# Patient Record
Sex: FEMALE | Race: WHITE | URBAN - METROPOLITAN AREA
[De-identification: names, ages, dates, MRNs, and addresses within clinical notes are randomized per-mention and may not be internally consistent; named-entity substitution may affect disease eponyms.]

---

## 2019-04-01 ENCOUNTER — HOSPITAL ENCOUNTER (OUTPATIENT)
Dept: MEDSURG UNIT | Facility: HOSPITAL | Age: 64
End: 2019-04-08
Attending: INTERNAL MEDICINE | Admitting: INTERNAL MEDICINE

## 2019-04-01 LAB
ABS NEUT (OLG): 5.98 X10(3)/MCL (ref 2.1–9.2)
ALBUMIN SERPL-MCNC: 3.5 GM/DL (ref 3.4–5)
ALBUMIN/GLOB SERPL: 0.8 {RATIO}
ALP SERPL-CCNC: 65 UNIT/L (ref 38–126)
ALT SERPL-CCNC: 22 UNIT/L (ref 12–78)
APPEARANCE, UA: ABNORMAL
AST SERPL-CCNC: 21 UNIT/L (ref 15–37)
BACTERIA SPEC CULT: ABNORMAL /HPF
BASOPHILS # BLD AUTO: 0 X10(3)/MCL (ref 0–0.2)
BASOPHILS NFR BLD AUTO: 0 %
BILIRUB SERPL-MCNC: 0.5 MG/DL (ref 0.2–1)
BILIRUB UR QL STRIP: NEGATIVE
BILIRUBIN DIRECT+TOT PNL SERPL-MCNC: 0.1 MG/DL (ref 0–0.2)
BILIRUBIN DIRECT+TOT PNL SERPL-MCNC: 0.4 MG/DL (ref 0–0.8)
BUN SERPL-MCNC: 16 MG/DL (ref 7–18)
CALCIUM SERPL-MCNC: 8.6 MG/DL (ref 8.5–10.1)
CHLORIDE SERPL-SCNC: 107 MMOL/L (ref 98–107)
CK SERPL-CCNC: 158 UNIT/L (ref 26–192)
CO2 SERPL-SCNC: 25 MMOL/L (ref 21–32)
COLOR UR: YELLOW
CREAT SERPL-MCNC: 0.81 MG/DL (ref 0.55–1.02)
CRP SERPL HS-MCNC: 5.14 MG/L (ref 0–3)
EOSINOPHIL # BLD AUTO: 0 X10(3)/MCL (ref 0–0.9)
EOSINOPHIL NFR BLD AUTO: 0 %
ERYTHROCYTE [DISTWIDTH] IN BLOOD BY AUTOMATED COUNT: 12.3 % (ref 11.5–17)
ERYTHROCYTE [SEDIMENTATION RATE] IN BLOOD: 18 MM/HR (ref 0–20)
EST. AVERAGE GLUCOSE BLD GHB EST-MCNC: 131 MG/DL
GLOBULIN SER-MCNC: 4.3 GM/DL (ref 2.4–3.5)
GLUCOSE (UA): NEGATIVE
GLUCOSE SERPL-MCNC: 78 MG/DL (ref 74–106)
HBA1C MFR BLD: 6.2 % (ref 4.2–6.3)
HCT VFR BLD AUTO: 45.8 % (ref 37–47)
HGB BLD-MCNC: 13.8 GM/DL (ref 12–16)
HGB UR QL STRIP: NEGATIVE
KETONES UR QL STRIP: NEGATIVE
LEUKOCYTE ESTERASE UR QL STRIP: ABNORMAL
LYMPHOCYTES # BLD AUTO: 1.2 X10(3)/MCL (ref 0.6–4.6)
LYMPHOCYTES NFR BLD AUTO: 15 %
MCH RBC QN AUTO: 29.3 PG (ref 27–31)
MCHC RBC AUTO-ENTMCNC: 30.1 GM/DL (ref 33–36)
MCV RBC AUTO: 97.2 FL (ref 80–94)
MONOCYTES # BLD AUTO: 0.6 X10(3)/MCL (ref 0.1–1.3)
MONOCYTES NFR BLD AUTO: 8 %
NEUTROPHILS # BLD AUTO: 5.98 X10(3)/MCL (ref 2.1–9.2)
NEUTROPHILS NFR BLD AUTO: 75 %
NITRITE UR QL STRIP: POSITIVE
PH UR STRIP: 6 [PH] (ref 5–9)
PLATELET # BLD AUTO: 265 X10(3)/MCL (ref 130–400)
PMV BLD AUTO: 9.6 FL (ref 9.4–12.4)
POTASSIUM SERPL-SCNC: 3.6 MMOL/L (ref 3.5–5.1)
PROT SERPL-MCNC: 7.8 GM/DL (ref 6.4–8.2)
PROT UR QL STRIP: NEGATIVE
RBC # BLD AUTO: 4.71 X10(6)/MCL (ref 4.2–5.4)
RBC #/AREA URNS HPF: 25 /HPF (ref 0–2)
SODIUM SERPL-SCNC: 140 MMOL/L (ref 136–145)
SP GR UR STRIP: 1.02 (ref 1–1.03)
SQUAMOUS EPITHELIAL, UA: ABNORMAL
T4 FREE SERPL-MCNC: 1.19 NG/DL (ref 0.76–1.46)
TSH SERPL-ACNC: 2.59 MIU/L (ref 0.36–3.74)
UROBILINOGEN UR STRIP-ACNC: 0.2
VIT B12 SERPL-MCNC: 529 PG/ML (ref 193–986)
WBC # SPEC AUTO: 7.9 X10(3)/MCL (ref 4.5–11.5)
WBC #/AREA URNS HPF: 27 /HPF (ref 0–3)

## 2019-04-02 LAB
APTT PPP: 29.1 SECOND(S) (ref 24.2–33.9)
GLUCOSE CSF-MCNC: 55 MG/DL (ref 40–70)
GRAM STN SPEC: NORMAL
INR PPP: 1.1 (ref 0–1.3)
LDH SERPL-CCNC: 136 UNIT/L (ref 84–246)
PROT CSF-MCNC: 55 MG/DL (ref 15–45)
PROT CSF-MCNC: 59 MG/DL (ref 15–45)
PROTHROMBIN TIME: 13.9 SECOND(S) (ref 12–14)

## 2019-04-04 LAB
ABS NEUT (OLG): 1.85 X10(3)/MCL (ref 2.1–9.2)
ALBUMIN SERPL-MCNC: 2.6 GM/DL (ref 3.4–5)
ALBUMIN/GLOB SERPL: 0.4 {RATIO}
ALP SERPL-CCNC: 50 UNIT/L (ref 38–126)
ALT SERPL-CCNC: 41 UNIT/L (ref 12–78)
AST SERPL-CCNC: 39 UNIT/L (ref 15–37)
BASOPHILS # BLD AUTO: 0 X10(3)/MCL (ref 0–0.2)
BASOPHILS NFR BLD AUTO: 0 %
BILIRUB SERPL-MCNC: 0.8 MG/DL (ref 0.2–1)
BILIRUBIN DIRECT+TOT PNL SERPL-MCNC: 0.2 MG/DL (ref 0–0.2)
BILIRUBIN DIRECT+TOT PNL SERPL-MCNC: 0.6 MG/DL (ref 0–0.8)
BUN SERPL-MCNC: 12 MG/DL (ref 7–18)
CALCIUM SERPL-MCNC: 8 MG/DL (ref 8.5–10.1)
CHLORIDE SERPL-SCNC: 108 MMOL/L (ref 98–107)
CO2 SERPL-SCNC: 23 MMOL/L (ref 21–32)
CREAT SERPL-MCNC: 0.74 MG/DL (ref 0.55–1.02)
EOSINOPHIL # BLD AUTO: 0 X10(3)/MCL (ref 0–0.9)
EOSINOPHIL NFR BLD AUTO: 1 %
ERYTHROCYTE [DISTWIDTH] IN BLOOD BY AUTOMATED COUNT: 12.8 % (ref 11.5–17)
GLOBULIN SER-MCNC: 6.7 GM/DL (ref 2.4–3.5)
GLUCOSE SERPL-MCNC: 85 MG/DL (ref 74–106)
HCT VFR BLD AUTO: 36.9 % (ref 37–47)
HGB BLD-MCNC: 11.4 GM/DL (ref 12–16)
LYMPHOCYTES # BLD AUTO: 0.6 X10(3)/MCL (ref 0.6–4.6)
LYMPHOCYTES NFR BLD AUTO: 20 %
MCH RBC QN AUTO: 29.2 PG (ref 27–31)
MCHC RBC AUTO-ENTMCNC: 30.9 GM/DL (ref 33–36)
MCV RBC AUTO: 94.6 FL (ref 80–94)
MONOCYTES # BLD AUTO: 0.4 X10(3)/MCL (ref 0.1–1.3)
MONOCYTES NFR BLD AUTO: 14 %
NEUTROPHILS # BLD AUTO: 1.85 X10(3)/MCL (ref 2.1–9.2)
NEUTROPHILS NFR BLD AUTO: 64 %
PLATELET # BLD AUTO: 147 X10(3)/MCL (ref 130–400)
PMV BLD AUTO: 9.9 FL (ref 9.4–12.4)
POTASSIUM SERPL-SCNC: 3.6 MMOL/L (ref 3.5–5.1)
PROT SERPL-MCNC: 9.3 GM/DL (ref 6.4–8.2)
RBC # BLD AUTO: 3.9 X10(6)/MCL (ref 4.2–5.4)
SODIUM SERPL-SCNC: 138 MMOL/L (ref 136–145)
WBC # SPEC AUTO: 2.9 X10(3)/MCL (ref 4.5–11.5)

## 2019-04-05 LAB
ABS NEUT (OLG): 1.87 X10(3)/MCL (ref 2.1–9.2)
ALBUMIN SERPL-MCNC: 2.5 GM/DL (ref 3.4–5)
ALBUMIN/GLOB SERPL: 0.4 RATIO (ref 1.1–2)
ALP SERPL-CCNC: 49 UNIT/L (ref 38–126)
ALT SERPL-CCNC: 53 UNIT/L (ref 12–78)
AST SERPL-CCNC: 53 UNIT/L (ref 15–37)
BASOPHILS # BLD AUTO: 0 X10(3)/MCL (ref 0–0.2)
BASOPHILS NFR BLD AUTO: 0 %
BILIRUB SERPL-MCNC: 0.6 MG/DL (ref 0.2–1)
BILIRUBIN DIRECT+TOT PNL SERPL-MCNC: 0.1 MG/DL (ref 0–0.5)
BILIRUBIN DIRECT+TOT PNL SERPL-MCNC: 0.5 MG/DL (ref 0–0.8)
BUN SERPL-MCNC: 8 MG/DL (ref 7–18)
CALCIUM SERPL-MCNC: 8.1 MG/DL (ref 8.5–10.1)
CHLORIDE SERPL-SCNC: 109 MMOL/L (ref 98–107)
CO2 SERPL-SCNC: 23 MMOL/L (ref 21–32)
CREAT SERPL-MCNC: 0.68 MG/DL (ref 0.55–1.02)
EOSINOPHIL # BLD AUTO: 0 X10(3)/MCL (ref 0–0.9)
EOSINOPHIL NFR BLD AUTO: 2 %
ERYTHROCYTE [DISTWIDTH] IN BLOOD BY AUTOMATED COUNT: 12.7 % (ref 11.5–17)
GLOBULIN SER-MCNC: 5.7 GM/DL (ref 2.4–3.5)
GLUCOSE SERPL-MCNC: 85 MG/DL (ref 74–106)
HCT VFR BLD AUTO: 35.3 % (ref 37–47)
HGB BLD-MCNC: 10.9 GM/DL (ref 12–16)
LYMPHOCYTES # BLD AUTO: 0.7 X10(3)/MCL (ref 0.6–4.6)
LYMPHOCYTES NFR BLD AUTO: 23 %
MAGNESIUM SERPL-MCNC: 2.4 MG/DL (ref 1.8–2.4)
MCH RBC QN AUTO: 29.1 PG (ref 27–31)
MCHC RBC AUTO-ENTMCNC: 30.9 GM/DL (ref 33–36)
MCV RBC AUTO: 94.1 FL (ref 80–94)
MONOCYTES # BLD AUTO: 0.4 X10(3)/MCL (ref 0.1–1.3)
MONOCYTES NFR BLD AUTO: 14 %
NEUTROPHILS # BLD AUTO: 1.87 X10(3)/MCL (ref 2.1–9.2)
NEUTROPHILS NFR BLD AUTO: 61 %
PLATELET # BLD AUTO: 114 X10(3)/MCL (ref 130–400)
PMV BLD AUTO: 10.8 FL (ref 9.4–12.4)
POTASSIUM SERPL-SCNC: 4 MMOL/L (ref 3.5–5.1)
PROT SERPL-MCNC: 8.2 GM/DL (ref 6.4–8.2)
RBC # BLD AUTO: 3.75 X10(6)/MCL (ref 4.2–5.4)
SODIUM SERPL-SCNC: 140 MMOL/L (ref 136–145)
WBC # SPEC AUTO: 3.1 X10(3)/MCL (ref 4.5–11.5)

## 2019-04-06 LAB
ABS NEUT (OLG): 2.21 X10(3)/MCL (ref 2.1–9.2)
ALBUMIN SERPL-MCNC: 2.5 GM/DL (ref 3.4–5)
ALBUMIN/GLOB SERPL: 0.5 RATIO (ref 1.1–2)
ALP SERPL-CCNC: 49 UNIT/L (ref 38–126)
ALT SERPL-CCNC: 50 UNIT/L (ref 12–78)
AST SERPL-CCNC: 33 UNIT/L (ref 15–37)
BASOPHILS # BLD AUTO: 0 X10(3)/MCL (ref 0–0.2)
BASOPHILS NFR BLD AUTO: 1 %
BILIRUB SERPL-MCNC: 0.4 MG/DL (ref 0.2–1)
BILIRUBIN DIRECT+TOT PNL SERPL-MCNC: 0.1 MG/DL (ref 0–0.5)
BILIRUBIN DIRECT+TOT PNL SERPL-MCNC: 0.3 MG/DL (ref 0–0.8)
BUN SERPL-MCNC: 11 MG/DL (ref 7–18)
CALCIUM SERPL-MCNC: 8.2 MG/DL (ref 8.5–10.1)
CHLORIDE SERPL-SCNC: 110 MMOL/L (ref 98–107)
CO2 SERPL-SCNC: 25 MMOL/L (ref 21–32)
CREAT SERPL-MCNC: 0.65 MG/DL (ref 0.55–1.02)
EOSINOPHIL # BLD AUTO: 0 X10(3)/MCL (ref 0–0.9)
EOSINOPHIL NFR BLD AUTO: 1 %
ERYTHROCYTE [DISTWIDTH] IN BLOOD BY AUTOMATED COUNT: 12.7 % (ref 11.5–17)
GLOBULIN SER-MCNC: 5.5 GM/DL (ref 2.4–3.5)
GLUCOSE SERPL-MCNC: 80 MG/DL (ref 74–106)
HCT VFR BLD AUTO: 34.5 % (ref 37–47)
HGB BLD-MCNC: 10.7 GM/DL (ref 12–16)
LYMPHOCYTES # BLD AUTO: 0.7 X10(3)/MCL (ref 0.6–4.6)
LYMPHOCYTES NFR BLD AUTO: 21 %
MAGNESIUM SERPL-MCNC: 2.4 MG/DL (ref 1.8–2.4)
MCH RBC QN AUTO: 29.5 PG (ref 27–31)
MCHC RBC AUTO-ENTMCNC: 31 GM/DL (ref 33–36)
MCV RBC AUTO: 95 FL (ref 80–94)
MONOCYTES # BLD AUTO: 0.5 X10(3)/MCL (ref 0.1–1.3)
MONOCYTES NFR BLD AUTO: 14 %
NEUTROPHILS # BLD AUTO: 2.21 X10(3)/MCL (ref 2.1–9.2)
NEUTROPHILS NFR BLD AUTO: 63 %
PLATELET # BLD AUTO: 132 X10(3)/MCL (ref 130–400)
PMV BLD AUTO: 9.9 FL (ref 9.4–12.4)
POTASSIUM SERPL-SCNC: 3.6 MMOL/L (ref 3.5–5.1)
PROT SERPL-MCNC: 8 GM/DL (ref 6.4–8.2)
RBC # BLD AUTO: 3.63 X10(6)/MCL (ref 4.2–5.4)
SODIUM SERPL-SCNC: 143 MMOL/L (ref 136–145)
WBC # SPEC AUTO: 3.5 X10(3)/MCL (ref 4.5–11.5)

## 2019-04-07 LAB — FINAL CULTURE: NORMAL

## 2019-04-08 LAB
ABS NEUT (OLG): 6.29 X10(3)/MCL (ref 2.1–9.2)
ALBUMIN SERPL-MCNC: 2.7 GM/DL (ref 3.4–5)
ALBUMIN/GLOB SERPL: 0.5 RATIO (ref 1.1–2)
ALP SERPL-CCNC: 59 UNIT/L (ref 38–126)
ALT SERPL-CCNC: 38 UNIT/L (ref 12–78)
AST SERPL-CCNC: 28 UNIT/L (ref 15–37)
BASOPHILS NFR BLD MANUAL: 1 % (ref 0–2)
BILIRUB SERPL-MCNC: 0.7 MG/DL (ref 0.2–1)
BILIRUBIN DIRECT+TOT PNL SERPL-MCNC: 0.1 MG/DL (ref 0–0.5)
BILIRUBIN DIRECT+TOT PNL SERPL-MCNC: 0.6 MG/DL (ref 0–0.8)
BUN SERPL-MCNC: 13 MG/DL (ref 7–18)
CALCIUM SERPL-MCNC: 8.6 MG/DL (ref 8.5–10.1)
CHLORIDE SERPL-SCNC: 105 MMOL/L (ref 98–107)
CO2 SERPL-SCNC: 26 MMOL/L (ref 21–32)
CREAT SERPL-MCNC: 0.61 MG/DL (ref 0.55–1.02)
ERYTHROCYTE [DISTWIDTH] IN BLOOD BY AUTOMATED COUNT: 12.6 % (ref 11.5–17)
GLOBULIN SER-MCNC: 5.9 GM/DL (ref 2.4–3.5)
GLUCOSE SERPL-MCNC: 90 MG/DL (ref 74–106)
HCT VFR BLD AUTO: 37.6 % (ref 37–47)
HGB BLD-MCNC: 12.2 GM/DL (ref 12–16)
LYMPHOCYTES NFR BLD MANUAL: 3 % (ref 13–40)
MAGNESIUM SERPL-MCNC: 2.5 MG/DL (ref 1.8–2.4)
MCH RBC QN AUTO: 29.8 PG (ref 27–31)
MCHC RBC AUTO-ENTMCNC: 32.4 GM/DL (ref 33–36)
MCV RBC AUTO: 91.7 FL (ref 80–94)
MONOCYTES NFR BLD MANUAL: 10 % (ref 2–11)
NEUTROPHILS NFR BLD MANUAL: 86 % (ref 47–80)
PLATELET # BLD AUTO: 170 X10(3)/MCL (ref 130–400)
PLATELET # BLD EST: NORMAL 10*3/UL
PMV BLD AUTO: 9.8 FL (ref 7.4–10.4)
POTASSIUM SERPL-SCNC: 3.7 MMOL/L (ref 3.5–5.1)
PROT SERPL-MCNC: 8.6 GM/DL (ref 6.4–8.2)
RBC # BLD AUTO: 4.1 X10(6)/MCL (ref 4.2–5.4)
SODIUM SERPL-SCNC: 138 MMOL/L (ref 136–145)
WBC # SPEC AUTO: 7.5 X10(3)/MCL (ref 4.5–11.5)

## 2019-05-06 LAB — FINAL CULTURE: NORMAL

## 2022-05-01 NOTE — ED PROVIDER NOTES
Patient:   Zenobia Madrid             MRN: [id number]            FIN: [id number]               Age:   64 years     Sex:  Female     :  1955   Associated Diagnoses:   None   Author:   Brian Contreras MD      Addendum      Teaching-Supervisory Addendum-Brief   I participated in the following activities of this patients care: medical decision making.   I personally performed: supervision of the patient's care, the medical history, the physical exam, the medical decision making.   The case was discussed with: Dinesh VANG, Chirag (Res)   .   Results interpretation: I agree with the study interpretation in this patient's care with the exception of of as documented by me..   Notes:   This case was discussed with the mid-level provider and independent history and physical examination performed by me.      64 WF reports she's had weakness in her bilateral upper and lower extremities is been progressing for a week she said initially started as paresthesias in the hands and feet and what felt like weakness in the hands and feet and then over the week worsened 4 days ago she had a fall related to this and then today she couldn't get out of her chair and fell.  Earlier in the week when symptoms began she was placed on a week of prednisone and despite this therapy the weakness continued to progress.  She denies any head injury or any neck injury or previous neck trauma or neck pain.  No bowel or bladder incontinence.  She states that about a month ago she had a infection that she believes was pinkeye but otherwise has not had any recent illness and no GI issues.  On my examination cranial nerves II through XII are intact she has 4-5 flexion and extension of the bilateral upper extremities with decreased light touch sensation of the distal more than proximal upper extremities.  Normal light touch sensation bilateral lower extremities and normal strength in the lower extremities and my examination.  Description of  her symptoms are concerning for possible Guillain-Barré or my objective testing is more concerning for upper extremity weakness and paresthesias as bilateral.  I believe she should be admitted with MRI of the brain and cervical spine without contrast to evaluate for possible transverse myelitis as well as lumbar puncture possible Ron.  Neurology was consulted.  The hospitalist will admit.

## 2022-05-01 NOTE — DISCHARGE SUMMARY
Patient:   Zenobia Madrid             MRN: [id number]            FIN: [id number]               Age:   64 years     Sex:  Female     :  1955   Associated Diagnoses:   None   Author:   Earl Hines MD      Discharge Information      Discharge Summary Information   Admit/Discharge Dates   Admit Date: 2019  Discharge Date: 2019   Procedures   No procedures recorded for this visit.      Hospital Course   64-year-old  woman presented here with [place] Barré as well as an acute UTI.  During the course of her workup, she was found to have autoimmune positivity with Sjogren's syndrome.  She was also found to have parotid gland cysts for which ENT recommended outpatient monitoring but were deemed to be probably autoimmune.  She completed her therapy with IVIG.  Because of some deconditioned state, she was evaluated for rehab.  She was accepted and will be transitioned there.  We will continue with physical therapy and occupational therapy at that level of care.  As far as the possibility of an autoimmune disorder, she will follow-up with her primary care physician who can then transition her to see a rheumatologist.  She will also, if it is her choice, follow-up with ENT for the bilateral parotid cysts which were deemed to probably be autoimmune.    Admission/discharge diagnosis    [place] Barré's  Autoimmune disorder Sjogren's  Bilateral parotid cyst  Multiple falls secondary to the above  Acute cystitis present on admission with hematuria      Physical Examination   General:  Alert and oriented, No acute distress.    Neck:  Supple, Non-tender.    Respiratory:  Lungs are clear to auscultation, Respirations are non-labored.    Cardiovascular:  Normal rate, Regular rhythm.    Gastrointestinal:  Soft, Non-tender.       Discharge Plan   Discharge Summary Plan   Discharge disposition: discharge to home.     Orders     Orders   Patient Care:  Give all scheduled vaccinations prior to  discharge. (Order): 4/8/2019 11:46 CDT, Give all scheduled vaccinations prior to discharge.  Discontinue IV (Order): 4/8/2019 11:46 CDT  Pharmacy:  amlodipine 5 mg oral tablet (Document): 5 mg = 1 tab(s), Oral, Daily, 0 Refill(s)  Admit/Transfer/Discharge:  Discharge Activity (Order): Exercise as Tolerated  Discharge (Order): 4/8/2019 11:46 CDT, DC/Trans to [facility], Give all scheduled vaccinations prior to discharge..        Education and Follow-up   Counseled: patient, family, regarding diagnosis, regarding treatment, regarding medications.     Discharge Planning: Weakness, Easy-to-Read, Paresthesia, Easy-to-Read, Tracee Nails MD 4/23/2019 10:00:00,     time spent on discharge disposition included the following: final examination of the patient; discussion of the hospital stay; instructions for continuing care; final diagnoses; patient/family counseling; preparation of discharge records; preparation of prescriptions; referral forms; chart review.  Total time spent on discharge disposition was 32 minutes.   .

## 2022-05-01 NOTE — H&P
Patient:   Zenobia Madrid             MRN: [id number]            FIN: [id number]               Age:   64 years     Sex:  Female     :  1955   Associated Diagnoses:   None   Author:   Jelly VANG, Chirag ANTONIO      Basic Information   Source of history:  Self, Medical record.    Present at bedside:  Medical personnel.    Referral source:  Emergency department.    History limitation:  None.    Provider information/ cc:    PCP: SOFIYA GONSALES  ADVANCED DIRECTIVES: NONE  CODE STATUS: FULL.       Chief Complaint   2019 9:08 CDT        [facility]- reports pt bent over this AM and fell. denies LOC/thinners. pt states too weak to get up after fall. seen at [facility] [place]. PCP requests sent here.      History of Present Illness   64-year-old  female presents to the ED via EMS status post fall this a.m. which she was unable to get up this EMS was called for ED transport.  Patient was seen in the emergency department at [facility] on Friday for similar situation and symptoms secondary to multiple falls with weakness and associated numbness to upper and lower extremities.  Patient reports she's been having these symptoms off and on over the past 10 days which she has been seen by her PCP and was placed on a regimen of prednisone therapy which she recently completed.  Patient did have a CT scan done which revealed no acute abnormalities.  She reports no loss of consciousness, head injuries, dizziness, syncope, chest pain, shortness of breath, new foods or medications, or any sick contacts.  Patient reports she has a difficult time with bearing weight on her lower extremities especially standing from a seated position and intermittently loses grafts of items from her hands. Prior to arrival in the ED today,  patient reported bending over and subsequently had a fall which she was too weak to get up. Ed provider spoke with neurology services [facility] recommendation for MRI imaging. Labwork done  essentially unremarkable.  Urinalysis done suggestive of acute UTI.  Patient is admitted to hospital medicine services for further management.   VS /85 pulse 76 respirations 18 temperature 37.0°C O2 saturation 97% on room air      Review of Systems   Except as document, all other systems reviewed and negative      Health Status   Allergies:    Allergic Reactions (Selected)  No Known Medication Allergies   Current medications:  (Selected)         Histories   Past Medical History:   BLE weakness- currently being worked up by PCP   Family History:   Negative to present medical condition   Procedure history:   Negative   Social History     Drinks wine socially  Denies any illicit drug use  Denies any tobacco use  Lives alone-has family.        Physical Examination      Vital Signs (last 24 hrs)_____  Last Charted___________  Temp Oral     37 DegC  (APR 01 15:11)  Heart Rate Peripheral   76 bpm  (APR 01 15:11)  Resp Rate         18 br/min  (APR 01 15:11)  SBP      H 160mmHg  (APR 01 15:11)  DBP      85 mmHg  (APR 01 15:11)  SpO2      97 %  (APR 01 15:11)  Weight      92.7 kg  (APR 01 15:05)  Height      182 cm  (APR 01 15:05)  BMI      27.99  (APR 01 15:05)   General:  Alert and oriented, No acute distress, appears stated age, non-toxic appearing.    Eye:  Pupils are equal, round and reactive to light, Extraocular movements are intact, Normal conjunctiva, Vision unchanged.    HENT:  Normocephalic, Oral mucosa is moist, No pharyngeal erythema.    Neck:  Supple, Non-tender.    Respiratory:  Lungs are clear to auscultation, Respirations are non-labored, Breath sounds are equal, Symmetrical chest wall expansion.    Cardiovascular:  Normal rate, Regular rhythm, S1, S2, No gallop, Normal peripheral perfusion.         Capillary refill: Less than 2 seconds.    Gastrointestinal:  Soft, Non-tender, Non-distended, Normal bowel sounds.    Genitourinary:  No costovertebral angle tenderness, No urethral discharge.     Musculoskeletal:  Moves upper and lower extremities freely and on command; generalized weakness.    Integumentary:  Warm, Dry, Pink.    Neurologic:  Alert, Oriented, No focal deficits, Paresthesia was with associated weakness and numbness to upper and lower extremities; 3/5 strength.    Psychiatric:  Cooperative, Appropriate mood & affect.    Cognition and Speech:  Speech clear and coherent.       Review / Management   Results review:     Labs (Last four charted values)  WBC                  7.9 (APR 01)   Hgb                  13.8 (APR 01)   Hct                  45.8 (APR 01)   Plt                  265 (APR 01)   Na                   140 (APR 01)   K                    3.6 (APR 01)   CO2                  25.0 (APR 01)   Cl                   107 (APR 01)   Cr                   0.81 (APR 01)   BUN                  16.0 (APR 01)   Glucose Random       78 (APR 01) .    Laboratory Results   Today's Lab Results : PowerNote Discrete Results   4/1/2019 10:30 CDT       UA Appear                 CLOUDY                             UA Color                  YELLOW                             UA Spec Grav              1.018                             UA Bili                   Negative                             UA pH                     6.0                             UA Urobilinogen           0.2                             UA Blood                  Negative                             UA Glucose                Negative                             UA Ketones                Negative                             UA Protein                Negative                             UA Nitrite                Positive                             UA Leuk Est               3+                             UA WBC                    27 /HPF  HI                             UA RBC                    25 /HPF                             UA Bacteria               4+ /HPF                             UA Squam Epithelial       NONE SEEN    4/1/2019  9:30 CDT        WBC                       7.9 x10(3)/mcL                             RBC                       4.71 x10(6)/mcL                             Hgb                       13.8 gm/dL                             Hct                       45.8 %                             Platelet                  265 x10(3)/mcL                             MCV                       97.2 fL  HI                             MCH                       29.3 pg                             MCHC                      30.1 gm/dL  LOW                             RDW                       12.3 %                             MPV                       9.6 fL                             Abs Neut                  5.98 x10(3)/mcL                             Neutro Auto               75 %  NA                             Lymph Auto                15 %  NA                             Mono Auto                 8 %  NA                             Eos Auto                  0 %  NA                             Abs Eos                   0.0 x10(3)/mcL                             Basophil Auto             0 %  NA                             Abs Neutro                5.98 x10(3)/mcL                             Abs Lymph                 1.2 x10(3)/mcL                             Abs Mono                  0.6 x10(3)/mcL                             Abs Baso                  0.0 x10(3)/mcL                             Sed Rate                  18 mm/hr                             Sodium Lvl                140 mmol/L                             Potassium Lvl             3.6 mmol/L                             Chloride                  107 mmol/L                             CO2                       25.0 mmol/L                             Calcium Lvl               8.6 mg/dL                             Glucose Lvl               78 mg/dL                             BUN                       16.0 mg/dL                             Creatinine                0.81  mg/dL                             eGFR-AA                   >60 mL/min/1.73 m2  NA                             eGFR-MARILEE                  >60 mL/min/1.73 m2  NA                             Bili Total                0.5 mg/dL                             Bili Direct               0.10 mg/dL                             Bili Indirect             0.40 mg/dL                             AST                       21 unit/L                             ALT                       22 unit/L                             Alk Phos                  65 unit/L                             Total Protein             7.8 gm/dL                             Albumin Lvl               3.50 gm/dL                             Globulin                  4.30 gm/dL  HI                             A/G Ratio                 0.8  NA                             Vitamin B12 Lvl           529 pg/mL                             T4 Free                   1.19 ng/dL                             TSH                       2.590 mIU/L        Radiology results   Reviewed radiologist's report,   CT Head without contrast  Reason For Exam  Unilateral weakness/focal weakness    Radiology Report  Indication: Weakness, fall     Findings: Continuous axial images were performed through the levels of  the brain and skull base and are displayed in brain and bone window  settings. No prior studies are available for comparison.     There is no evidence of acute hemorrhage, focal infarction or midline  shift. Ventricles and extra-axial fluid spaces appear normal for  patient age. The orbital contents appear unremarkable. Visualized  paranasal sinuses and mastoid air spaces are clear.     IMPRESSION: No acute intracranial abnormalities are identified.       Signature Line  Electronically Signed By: Ambreen VANG, Luciano KERR  Date/Time Signed: 04/01/2019 10:20   Documentation reviewed:  Reviewed prior records, Reviewed home medications.    Condition:  Fair.       Impression and  Plan   Diagnosis       IMPRESSION:  BLE weakness- paresthesias  BUE weakness- paresthesias  Acute neurological abnormality-r/o [place] Barré versus transverse myelitis  Falls-from ground level multiple  Acute UTI-POA  Weakness    PLAN:  Consult neurology services.  Continue with steroid therapy per neurology direction.  Urine culture and sensitivity.  IV ceftriaxone.  Fall precautions. Consult therapy services. GI/DVT prophylaxis.      FULL CODE STATUS  PCP: SOFIYA GONSALES I, Marlena Anderson APRN, FNP, discussed the case with Dr. Chirag Reaves..     Orders     Orders   Patient Care:  Of chest pain [AFTER STAT: 1) EKG,  2) Pulse Ox, 3) Vital Signs] (Order): 4/1/2019 16:25 CDT, Of chest pain [AFTER STAT: 1) EKG,  2) Pulse Ox, 3) Vital Signs]  Notify Provider Vital Signs (Order): 4/1/2019 16:25 CDT, HR > 135, HR < 55, SBP > 190, SBP < 90, RR > 28, UO < 200mL in 8hr  of patients room number and as needed with any acute change or worsening condition (Order): 4/1/2019 16:25 CDT, of patients room number and as needed with any acute change or worsening condition  Neurological Assessment (Order): 4/1/2019 16:25 CDT, q4hr, 4/1/2019 17:00 CDT  Up ad Anuradha (Order): 4/1/2019 16:25 CDT, Constant Order, with assistance ONLY  Vital Signs (Order): 4/1/2019 16:25 CDT, q4hr  Pharmacy:  Zofran (Order): 4 mg, IV Push, q4hr PRN for nausea, first dose 4/1/2019 16:25 CDT, choose first if ordered with other treatments for nausea  Sodium Chloride 0.9% intravenous solution 1,000 mL (Order): 1,000 mL, 1,000 mL, IV, 75 mL/hr, start date 4/1/2019 16:25 CDT.     Orders   Laboratory:  Urine Culture and Sensitivity (Order): Now collect, 4/1/2019 16:43 CDT, Urine, Nurse collect, use urine already collected.     Education and Follow-up:       Counseled: Patient, Regarding diagnosis, Regarding treatment, Regarding medications.       Professional Services   Patient seen, personally examined by me.  Case discussed with NP.  63 y/o female with no  other significant medical history with progressive weakness in bilateral lower extremities and arms.  Patient describes associated numbness in bilateral hands.  Patient has had 2 falls over the last 5 days.  She was seen by her PCP recently and placed on oral prednisone for 1 week for the symptoms.  Patient reportedly had an elevated ESR rate before she was placed on prednisone.  Patient has been admitted for neurology evaluation and further workup.  Exam - 3/5 strength B UE and 4/5 B LE's. MRI of the brain and cervical spine are currently pending.  Urinalysis is positive for urinary tract infection.  She does have a globulin fraction elevation, labs are o/w unremarkable.   Patient's been placed on IV antibiotic therapy.  Consult PT/OT.  Agree with exam/plan as detailed above by NP.

## 2022-05-01 NOTE — ED PROVIDER NOTES
Patient:   Zenobia Madrid             MRN: [id number]            FIN: [id number]               Age:   64 years     Sex:  Female     :  1955   Associated Diagnoses:   Upper extremity weakness; Paresthesias   Author:   Chirag Montiel MD      Basic Information   Time seen: Date & time 2019 09:27:00, Immediately upon arrival.   History source: Patient.   Arrival mode: Ambulance.   History limitation: None.   Additional information: Chief Complaint from Nursing Triage Note : Chief Complaint   2019 9:08 CDT        Chief Complaint           [facility]- reports pt bent over this AM and fell. denies LOC/thinners. pt states too weak to get up after fall. seen at [facility] [place] Friday with negative CT. PCP requests sent here.  .      History of Present Illness   64-year-old female with no significant past medical history presents today complaining of upper and lower extremity weakness ×2 weeks duration.  Patient reports her symptoms have been progressive in nature and she has fallen a few times.  She reports difficulty standing from a seated position and also using her hands to grab household items.  She denies any loss of consciousness, hitting her head, dizziness, syncope, headaches.  She reports recently seeing her primary care physician in [place]. Had a head CT performed which she reports did not show any abnormalities.  She was started on prednisone 10 mg daily for which she just recently completed      Review of Systems   Constitutional symptoms:  No fever,    Skin symptoms:  No rash,    Eye symptoms:  No blurred vision,    Respiratory symptoms:  No shortness of breath, no cough.    Cardiovascular symptoms:  No chest pain, no palpitations.    Gastrointestinal symptoms:  No abdominal pain, no nausea, no vomiting.    Neurologic symptoms:  Weakness, no headache, no dizziness, no altered level of consciousness.    Psychiatric symptoms:  No anxiety, no depression.              Additional review of systems  information: All other systems reviewed and otherwise negative.      Health Status   Allergies: No known allergies.   Medications:  (Selected)   Documented Medications  Documented  prednisONE 10 mg oral tablet: 10 mg = 1 tab(s), Oral, Daily.      Past Medical/ Family/ Social History   Medical history: Negative.   Surgical history: Negative.   Family history: Unknown.   Social history: Alcohol use: Occasionally, Tobacco use: Denies, Drug use: Denies, Occupation: Employed, Family/social situation: Lives alone.      Physical Examination               Vital Signs   Vital Signs   4/1/2019 9:08 CDT        Temperature Temporal Artery               36.3 DegC                             Peripheral Pulse Rate     79 bpm                             Respiratory Rate          16 br/min                             SpO2                      100 %                             Oxygen Therapy            Room air                             Systolic Blood Pressure   137 mmHg                             Diastolic Blood Pressure  104 mmHg  HI  .   General:  Alert, no acute distress.    Fulda coma scale:  Total score: Total score: 15.   Neurological:  Alert and oriented to person, place, time, and situation, No focal neurological deficit observed, CN II-XII intact, normal sensory observed, normal speech observed.    Skin:  No pallor, no rash.    Head:  Normocephalic, atraumatic.    Eye:  Pupils are equal, round and reactive to light, extraocular movements are intact, normal conjunctiva.    Ears, nose, mouth and throat:  Oral mucosa moist.   Cardiovascular:  Regular rate and rhythm, No murmur, Normal peripheral perfusion.    Respiratory:  Lungs are clear to auscultation, respirations are non-labored.    Gastrointestinal:  Soft, Nontender, Non distended, Normal bowel sounds.    Musculoskeletal:  No swelling, no deformity, Lower extremity strength 5 out of 5 bilaterally, upper extremity strength 4 out of 5 bilaterally; unrestricted passage  range of motion of the bilateral upper extremities however upon active range of motion/overhead adduction is limited to 45°; no sensory deficits of the bilateral upper and lower extremities, radial pulses +2, pedal pulses +2.  No bruises or discolorations noted on the bilateral upper and lower extremities.       Medical Decision Making   Documents reviewed:  Emergency department nurses' notes.   Orders  Launch Order Profile (Selected)   Inpatient Orders  Ordered (Dispatched)  CBC: Stat collect, 04/01/19 9:25:00 CDT, Blood, Stop date 04/01/19 9:26:00 CDT, Lab Collect, Print Label By Order Location, 04/01/19 9:25:00 CDT  CMP: Stat collect, 04/01/19 9:25:00 CDT, Blood, Stop date 04/01/19 9:26:00 CDT, Lab Collect, Print Label By Order Location, 04/01/19 9:25:00 CDT  ESR: Stat collect, 04/01/19 9:26:00 CDT, Blood, Stop date 04/01/19 9:26:00 CDT, Lab Collect, Print Label By Order Location, 04/01/19 9:26:00 CDT  Free T4: Stat collect, 04/01/19 9:26:00 CDT, Blood, Stop date 04/01/19 9:26:00 CDT, Lab Collect, Print Label By Order Location, 04/01/19 9:26:00 CDT  TSH: Stat collect, 04/01/19 9:26:00 CDT, Blood, Stop date 04/01/19 9:26:00 CDT, Lab Collect, Print Label By Order Location, 04/01/19 9:26:00 CDT  Ordered (Exam Ordered)  CT Head W/O Contrast: Stat, 04/01/19 9:26:00 CDT, Unilateral weakness/focal weakness, None, Stretcher, Rad Type, Schedule this test, 04/01/19 9:26:00 CDT.      Reexamination/ Reevaluation   Time: 4/1/2019 12:15:00 .   Vital signs   results included from flowsheet : Vital Signs   4/1/2019 11:00 CDT       Peripheral Pulse Rate     76 bpm                             Heart Rate Monitored      76 bpm                             Respiratory Rate          13 br/min                             SpO2                      100 %                             Oxygen Therapy            Room air                             Systolic Blood Pressure   157 mmHg  HI                             Diastolic Blood Pressure  92  mmHg  HI                             Mean Arterial Pressure, Cuff              114 mmHg     Course: progressing as expected.   Assessment: exam unchanged.   Notes: Spoke with Neurology, Dr. Ca Grey regarding patient;  agreed to admit the patient for further workup..      Impression and Plan   Diagnosis   Upper extremity weakness (ENH87-AB R29.898)   Paresthesias (NVU85-EP R20.2)      Calls-Consults   -  Neurology .   Plan   Condition: Stable.    Disposition: Admit time  4/1/2019 12:30:00.

## 2022-05-04 NOTE — HISTORICAL [facility] CERNER
This is a historical note converted from [employer]. Formatting and pictures may have been removed.  Please reference [employer] for original formatting and attached multimedia. Chief Complaint  [facility]- reports pt bent over this AM and fell. denies LOC/thinners. pt states too weak to get up after fall. seen at [facility] Friday with negative CT. PCP requests sent here.  Reason for Consultation  weakness  History of Present Illness  64-year-old woman who was admitted after a fall.??Consulted for generalized weakness. ?She was unable to get up after falling and EMS was called.? This happened previously on Friday and she was seen at [facility].? She reported several weeks ago started with bilateral upper extremity numbness which has progressed to involve her feet as well.? She has weakness which is progressed over the past week involving both upper extremities and lower extremities.  ?  Urinalysis is positive for nitrites, leukocyte esterase and positive WBCs and bacteria  White blood count 7.9, hemoglobin hematocrit 13 and 45 platelets 265 creatinine 0.81 CT head unremarkable  ?  MRI brain unremarkable, MRI cervical spine shows multilevel degenerative disc disease worse at C5-C6 and C6-C7 with neuroforaminal stenosis at those levels and mild spinal stenosis.? There is no intra-cord signal abnormality.? Incidentally noted are bone marrow hypointensity as well as atrophy and cystic replacement of the parotid glands.  ?  +recent steroid use per chart  Review of Systems  Constitutional: No fever, No chills, No weight loss.  Eye: No visual disturbances.  ENMT: No ear pain.  Respiratory: No shortness of breath  Cardiovascular: No chest pain  Gastrointestinal: No nausea, No vomiting.  Genitourinary: No dysuria  Immunologic: No immunocompromise  Musculoskeletal: No joint pain  Integumentary: No other significant skin complaints, No rash  Neurologic: Negative except as documented in history of present illness  Psychiatric: No  depression  Physical Exam  Vitals & Measurements  T:?37? ?C (Oral)? TMIN:?36.3? ?C (Temporal Artery)? TMAX:?37? ?C (Oral)? HR:?76(Peripheral)? RR:?18? BP:?160/85? SpO2:?97%? WT:?92.7?kg?  General:?alert and oriented, No acute distress, no audible wheezes pulse intact. No edema  ?  ?  ?   Cognition and Comprehension  Speech and language intact.  follow commands  speech fluent  attention intact  memory for recent events intact from history taking  affect pleasant  fund of knowledge adequate  ?  ?   Cranial Nerves  II. Optic: Visual fields (Full to confrontation both eyes).  III, IV, VI. Oculomotor: Intact, Pupils equal, round and reactive to light, no nystagmus,  V. Trigeminal: Sensation of light touch (Normal)  VII. Facial: no facial asymmetry.  VIII, hearing intact to spoken voice  IX/X. Glossopharyngeal/ Vagus: Voice (normal).  XI. shoulder shrug normal  XII. Hypoglossal: Intact.  ?   Muscle Strength & Tone  Normal upper extremity tone,  Normal lower extremity tone.  Bilateral upper extremity symmetric proximal weakness greater than distal weakness  Bilateral lower extremity symmetric proximal weakness which is mild greater than distal weakness  ?   Sensation  decreased sensation to? vibration in a gradient distribution  ?  Reflexes  areflexic  ?  Coordination and Gait  not tested  ?  ?  ?  Assessment/Plan  1.?Generalized weakness  Exam shows proximal weakness and decreased sensation distally.??weakness may be multifactorial (UTI, mild DJD, neuropathy). check CK to eval other causes for prox weakness. ??given parotid Gland abnormality check?autoimmune labs including?JOSHUA, SSA, SSB  2.?Urinary tract infection  ?treatment per primary team   Problem List/Past Medical History  Ongoing  No qualifying data  Historical  No qualifying data  Medications  Inpatient  amLODIPine, 5 mg= 1 tab(s), Oral, Daily  Sodium Chloride 0.9% intravenous solution 1,000 mL, 1000 mL, IV  Zofran, 4 mg= 2 mL, IV Push, q4hr, PRN  Home  No active  home medications  Allergies  No Known Medication Allergies  Social History  Alcohol  Current, Wine, 1-2 times per month, 04/01/2019  Employment/School  Employed, 04/01/2019  Home/Environment  Lives with Alone. Living situation: Home/Independent., 04/01/2019  Nutrition/Health  Regular, 04/01/2019  Substance Abuse  Never, 04/01/2019  Tobacco  Never (less than 100 in lifetime), N/A, 04/01/2019

## 2022-05-04 NOTE — HISTORICAL [facility] CERNER
This is a historical note converted from [employer]. Formatting and pictures may have been removed.  Please reference [employer] for original formatting and attached multimedia. Chief Complaint  [facility]I- reports pt bent over this AM and fell. denies LOC/thinners. pt states too weak to get up after fall. seen at [facility] Friday with negative CT. PCP requests sent here.  Reason for Consultation  Parotid cyst  History of Present Illness  64-year-old female status post fall and was unable to get up. ?She was admitted for weakness.? 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 JOSHUA is positive.? As his anti--SSA and anti--SSB, and RA. ?MRI revealed incidentally noted bilateral?parotid cysts.? These were multiple?and small bilaterally. ?She does have a more discrete cyst on the left at the tail of the parotid.? She has no prior knowledge of the cyst.? She does report 2 occasions where she had parotid swelling that subsequently resolved.? She does report some dry mouth.? She is not otherwise immunocompromised.? She has undergone previous biopsy.  Review of Systems  Constitutional_negative  Eye_no vision changes  ENMT see HPI  Respiratory negative  Cardiovascular negative  Gastrointestinal negative  Hema/Lymph_negative  Endocrine negative  Immunologic negative  Musculoskeletal negative  Integumentary negative  Neurologic negative  All Other ROS_ negative  Physical Exam  Vitals & Measurements  T:?37.4? ?C (Oral)? TMIN:?36.8? ?C (Oral)? TMAX:?37.5? ?C (Oral)? HR:?96(Peripheral)? RR:?18? BP:?152/94? SpO2:?96%?  General_NAD, normocephalic and atraumatic, normal voice  Eye_EOMI, PERRL  Ear? normal pinnas and EACs  Nose - Inferior turbinates normal, septum midline, mucosa moist  Oral cavity - Tongue normal, no mucosal lesions,  Oropharynx - no lesions noted  Neck- no lymphadenopathy, no thyromegaly. ?Discrete 1.5-2 cm?lesion of the left tail of parotid.? Right greater than left parotid enlargement.? Multiple small palpable  lesions in both carotids.  Respiratory- no increased work of breathing  Integumentary- no rashes, no skin lesions  Neurologic- CN II-XII intact  ?   MRI reveals bilateral parotid lesions.  Assessment/Plan  1.?Generalized weakness  2.?Urinary tract infection  64-year-old female with incidentally found bilateral parotid cystic lesions.? She has a more prominent left approximate 1.5 cm lesion at the tail.  -In light of her positive JOSHUA these are likely autoimmune in nature.  -Lymphoepithelial cysts are also on the differential however these are uncommon?in the immunocompetent population  -Whartons tumors and?malignancies are lower on the differential.  -This can be worked up as an outpatient. ?I do feel she will ultimately need an?ultrasound-guided FNA.? Left ala parotid lesion is the most obvious target.  -He can follow-up with me or my partner?Ortiz Ugalde?in her hometown of [place].   Problem List/Past Medical History  Ongoing  No qualifying data  Historical  No qualifying data  Medications  Inpatient  amLODIPine, 5 mg= 1 tab(s), Oral, Daily  IVIG (Privigen), 10 gm= 100 mL, IV, Daily  IVIG (Privigen), 80 gm= 800 mL, IV, Daily  Rocephin, 1000 mg= 50 mL, IV Piggyback, q24hr  Sodium Chloride 0.9% intravenous solution 1,000 mL, 1000 mL, IV  Zofran, 4 mg= 2 mL, IV Push, q4hr, PRN  Home  No active home medications  Allergies  No Known Medication Allergies  Social History  Alcohol  Current, Wine, 1-2 times per month, 04/01/2019  Employment/School  Employed, 04/01/2019  Home/Environment  Lives with Alone. Living situation: Home/Independent., 04/01/2019  Nutrition/Health  Regular, 04/01/2019  Substance Abuse  Never, 04/01/2019  Tobacco  Never (less than 100 in lifetime), N/A, 04/01/2019